# Patient Record
(demographics unavailable — no encounter records)

---

## 2024-11-07 NOTE — HISTORY OF PRESENT ILLNESS
[FreeTextEntry1] : PReP f/u [de-identified] : neck pain x 2+ years, preventing sleep. tried PT in past w/o help. chiro was helpful but then got too busy. willing to go back.  here for apretude inj and labs. seeing Dr Dumas, had anal test showing HPV but not sure about Pap or if done.  has been using DoxyPEP. right after sex with unknown partner. sometimes uses with primary partner too.

## 2024-11-07 NOTE — HISTORY OF PRESENT ILLNESS
[FreeTextEntry1] : PReP f/u [de-identified] : neck pain x 2+ years, preventing sleep. tried PT in past w/o help. chiro was helpful but then got too busy. willing to go back.  here for apretude inj and labs. seeing Dr Dumas, had anal test showing HPV but not sure about Pap or if done.  has been using DoxyPEP. right after sex with unknown partner. sometimes uses with primary partner too.

## 2025-02-18 NOTE — HEALTH RISK ASSESSMENT
[0] : 2) Feeling down, depressed, or hopeless: Not at all (0) [PHQ-2 Negative - No further assessment needed] : PHQ-2 Negative - No further assessment needed [Yes] : Yes [Monthly or less (1 pt)] : Monthly or less (1 point) [Monthly (2 pts)] : Monthly (2 points)

## 2025-02-21 NOTE — HISTORY OF PRESENT ILLNESS
[FreeTextEntry1] : pREP Follow up PHQ-2(0PT0 [de-identified] : 38 yo M here for PrEP (on Apretude) follow-up. Patient reports feeling generally well, he is glad to update that he recently traveled for a cruise in the [facility] and was able to tolerate both plane flights as well as the cruise without using Xanax for his phobia of flying/traveling. Patient has, however, still continued to struggle with anxiety: reports sensation of generalized anxiety about tasks he has to do at work as well as social anxiety. He feels best after exercising and when he is spending time with friends undergoing new experiences. He endorses marijuana usage in order to alleviate anxiety but complains of a decrease in energy as a result.   He has tried multiple medications with [facility] before and had SE of erectile dysfunction, so he stopped taking the medication. He is most interested in seeing a new therapist to get to the bottom of his anxiety.

## 2025-02-21 NOTE — HISTORY OF PRESENT ILLNESS
[FreeTextEntry1] : pREP Follow up PHQ-2(0PT0 [de-identified] : 36 yo M here for PrEP (on Apretude) follow-up. Patient reports feeling generally well, he is glad to update that he recently traveled for a cruise in the [facility] and was able to tolerate both plane flights as well as the cruise without using Xanax for his phobia of flying/traveling. Patient has, however, still continued to struggle with anxiety: reports sensation of generalized anxiety about tasks he has to do at work as well as social anxiety. He feels best after exercising and when he is spending time with friends undergoing new experiences. He endorses marijuana usage in order to alleviate anxiety but complains of a decrease in energy as a result.   He has tried multiple medications with [facility] before and had SE of erectile dysfunction, so he stopped taking the medication. He is most interested in seeing a new therapist to get to the bottom of his anxiety.

## 2025-02-21 NOTE — HISTORY OF PRESENT ILLNESS
[FreeTextEntry1] : pREP Follow up PHQ-2(0PT0 [de-identified] : 38 yo M here for PrEP (on Apretude) follow-up. Patient reports feeling generally well, he is glad to update that he recently traveled for a cruise in the [facility] and was able to tolerate both plane flights as well as the cruise without using Xanax for his phobia of flying/traveling. Patient has, however, still continued to struggle with anxiety: reports sensation of generalized anxiety about tasks he has to do at work as well as social anxiety. He feels best after exercising and when he is spending time with friends undergoing new experiences. He endorses marijuana usage in order to alleviate anxiety but complains of a decrease in energy as a result.   He has tried multiple medications with [facility] before and had SE of erectile dysfunction, so he stopped taking the medication. He is most interested in seeing a new therapist to get to the bottom of his anxiety.

## 2025-03-21 NOTE — HEALTH RISK ASSESSMENT
[0] : 2) Feeling down, depressed, or hopeless: Not at all (0) [PHQ-2 Negative - No further assessment needed] : PHQ-2 Negative - No further assessment needed [[id number]] : 0

## 2025-03-21 NOTE — HISTORY OF PRESENT ILLNESS
[FreeTextEntry1] : Pt is here for forms  PHQ-2(0PT) [de-identified] : every day for last month having focal anal pain, no d/c, no mucus. no blood. no F/C/NS. feeling fine otherwise. started within hours of having rec anal sex. no sex since. anxiety continues.  working with therapist now, three weeks in, going well. planning to take a mental health break beginning in mid April for 2-3 months. will need letter next visit

## 2025-04-15 NOTE — HISTORY OF PRESENT ILLNESS
[FreeTextEntry1] : PReP f/u [de-identified] : 38M MSM (on prep w apretude), anxiety disorder presents for f/u  Anal fissure significantly improved and anal pain completely resolved. Used topical ointment otc x 3 days that helped (phenyl+lidocaine)  No sexual partners since last visit  Anxiety is stable. Seeing therapist for past month and uses hydroxyzine prn when going to [place]  Wants to return in one month for FMLA paperwork given pt is leaving for 3 months for mental health break Feels well otherwise

## 2025-04-15 NOTE — REVIEW OF SYSTEMS
[Anxiety] : anxiety [Negative] : Gastrointestinal [Insomnia] : no insomnia [Depression] : no depression

## 2025-04-15 NOTE — HISTORY OF PRESENT ILLNESS
[FreeTextEntry1] : PReP f/u [de-identified] : 38M MSM (on prep w apretude), anxiety disorder presents for f/u  Anal fissure significantly improved and anal pain completely resolved. Used topical ointment otc x 3 days that helped (phenyl+lidocaine)  No sexual partners since last visit  Anxiety is stable. Seeing therapist for past month and uses hydroxyzine prn when going to [place]  Wants to return in one month for FMLA paperwork given pt is leaving for 3 months for mental health break Feels well otherwise

## 2025-05-20 NOTE — HEALTH RISK ASSESSMENT
[0] : 2) Feeling down, depressed, or hopeless: Not at all (0) [PHQ-2 Negative - No further assessment needed] : PHQ-2 Negative - No further assessment needed [[id number]] : 0

## 2025-05-20 NOTE — HEALTH RISK ASSESSMENT
[0] : 2) Feeling down, depressed, or hopeless: Not at all (0) [PHQ-2 Negative - No further assessment needed] : PHQ-2 Negative - No further assessment needed [[id number]] : 0

## 2025-05-20 NOTE — HISTORY OF PRESENT ILLNESS
[FreeTextEntry1] : FMLA Forms PHQ-2(0PT) [de-identified] : 38M Anxiety and on HIV PrEP with Apertude and doxy-PEP presents for anxiety symptoms with FMLA forms. He has been experiencing severe anxiety for years, with daily panic attacks Reports taking lexapro in the past, but discontinued d/t erectile dysfunction. Also has Xanax prescription for flying phobia but only uses when travelling. He currently has weekly psychotherapy. His symptoms are severe and affect both his home and work life. He reports being unable to perform key functions at his job as a  due to social anxiety, fear of public speaking and anxious thoughts keeping him from concentrating on tasks. He also finds that his anxiety leads him to binge-drink, reporting 10 drinks 3x per week at bars, primarily driven by social anxiety. He also smokes copious MJ when drunk and is interested in cutting back on alcohol and MJ. His current plan includes taking 3-month FMLA break from work and has already arranged this with them. here for paperwork primarily plus to discuss treatment plan

## 2025-05-20 NOTE — PHYSICAL EXAM
[No Acute Distress] : no acute distress [Well Nourished] : well nourished [Well Developed] : well developed [Well-Appearing] : well-appearing [Normal Sclera/Conjunctiva] : normal sclera/conjunctiva [PERRL] : pupils equal round and reactive to light [EOMI] : extraocular movements intact [Normal Outer Ear/Nose] : the outer ears and nose were normal in appearance [Normal Oropharynx] : the oropharynx was normal [No JVD] : no jugular venous distention [No Lymphadenopathy] : no lymphadenopathy [Supple] : supple [Thyroid Normal, No Nodules] : the thyroid was normal and there were no nodules present [No Accessory Muscle Use] : no accessory muscle use [Clear to Auscultation] : lungs were clear to auscultation bilaterally [Normal Rate] : normal rate  [Regular Rhythm] : with a regular rhythm [Normal S1, S2] : normal S1 and S2 [No Murmur] : no murmur heard [No Carotid Bruits] : no carotid bruits [No Abdominal Bruit] : a ~M bruit was not heard ~T in the abdomen [No Varicosities] : no varicosities [Pedal Pulses Present] : the pedal pulses are present [No Edema] : there was no peripheral edema [No Palpable Aorta] : no palpable aorta [No Extremity Clubbing/Cyanosis] : no extremity clubbing/cyanosis [Soft] : abdomen soft [Non Tender] : non-tender [Non-distended] : non-distended [No Masses] : no abdominal mass palpated [No HSM] : no HSM [Normal Bowel Sounds] : normal bowel sounds [Normal Posterior Cervical Nodes] : no posterior cervical lymphadenopathy [Normal Anterior Cervical Nodes] : no anterior cervical lymphadenopathy [No CVA Tenderness] : no CVA  tenderness [No Spinal Tenderness] : no spinal tenderness [No Joint Swelling] : no joint swelling [Grossly Normal Strength/Tone] : grossly normal strength/tone [No Rash] : no rash [Coordination Grossly Intact] : coordination grossly intact [No Focal Deficits] : no focal deficits [Normal Gait] : normal gait [Deep Tendon Reflexes (DTR)] : deep tendon reflexes were 2+ and symmetric [Normal Affect] : the affect was normal [Normal Insight/Judgement] : insight and judgment were intact [de-identified] : short coughing fit during examination

## 2025-05-20 NOTE — PHYSICAL EXAM
[No Acute Distress] : no acute distress [Well Nourished] : well nourished [Well Developed] : well developed [Well-Appearing] : well-appearing [Normal Sclera/Conjunctiva] : normal sclera/conjunctiva [PERRL] : pupils equal round and reactive to light [EOMI] : extraocular movements intact [Normal Outer Ear/Nose] : the outer ears and nose were normal in appearance [Normal Oropharynx] : the oropharynx was normal [No JVD] : no jugular venous distention [No Lymphadenopathy] : no lymphadenopathy [Supple] : supple [Thyroid Normal, No Nodules] : the thyroid was normal and there were no nodules present [No Accessory Muscle Use] : no accessory muscle use [Clear to Auscultation] : lungs were clear to auscultation bilaterally [Normal Rate] : normal rate  [Regular Rhythm] : with a regular rhythm [Normal S1, S2] : normal S1 and S2 [No Murmur] : no murmur heard [No Carotid Bruits] : no carotid bruits [No Abdominal Bruit] : a ~M bruit was not heard ~T in the abdomen [No Varicosities] : no varicosities [Pedal Pulses Present] : the pedal pulses are present [No Edema] : there was no peripheral edema [No Palpable Aorta] : no palpable aorta [No Extremity Clubbing/Cyanosis] : no extremity clubbing/cyanosis [Soft] : abdomen soft [Non Tender] : non-tender [Non-distended] : non-distended [No Masses] : no abdominal mass palpated [No HSM] : no HSM [Normal Bowel Sounds] : normal bowel sounds [Normal Posterior Cervical Nodes] : no posterior cervical lymphadenopathy [Normal Anterior Cervical Nodes] : no anterior cervical lymphadenopathy [No CVA Tenderness] : no CVA  tenderness [No Spinal Tenderness] : no spinal tenderness [No Joint Swelling] : no joint swelling [Grossly Normal Strength/Tone] : grossly normal strength/tone [No Rash] : no rash [Coordination Grossly Intact] : coordination grossly intact [No Focal Deficits] : no focal deficits [Normal Gait] : normal gait [Deep Tendon Reflexes (DTR)] : deep tendon reflexes were 2+ and symmetric [Normal Affect] : the affect was normal [Normal Insight/Judgement] : insight and judgment were intact [de-identified] : short coughing fit during examination

## 2025-05-20 NOTE — HISTORY OF PRESENT ILLNESS
[FreeTextEntry1] : FMLA Forms PHQ-2(0PT) [de-identified] : 38M Anxiety and on HIV PrEP with Apertude and doxy-PEP presents for anxiety symptoms with FMLA forms. He has been experiencing severe anxiety for years, with daily panic attacks Reports taking lexapro in the past, but discontinued d/t erectile dysfunction. Also has Xanax prescription for flying phobia but only uses when travelling. He currently has weekly psychotherapy. His symptoms are severe and affect both his home and work life. He reports being unable to perform key functions at his job as a  due to social anxiety, fear of public speaking and anxious thoughts keeping him from concentrating on tasks. He also finds that his anxiety leads him to binge-drink, reporting 10 drinks 3x per week at bars, primarily driven by social anxiety. He also smokes copious MJ when drunk and is interested in cutting back on alcohol and MJ. His current plan includes taking 3-month FMLA break from work and has already arranged this with them. here for paperwork primarily plus to discuss treatment plan

## 2025-06-13 NOTE — HISTORY OF PRESENT ILLNESS
[FreeTextEntry1] :  follow up Apretude [de-identified] : started only propranolol so far and it has worked great for acute anxiety situations like haircuts. now making it through very easily. decided not to start dulox or naltrexone yet d/t very busy this month and needs to be able to drink alcohol for jobs. mood has overall been better since taking a break from work. anxiety less. no change in amount of alcohol intake vs last time. planning to start duloxetine 6/30.  due for APR today as well

## 2025-06-13 NOTE — HISTORY OF PRESENT ILLNESS
[FreeTextEntry1] :  follow up Apretude [de-identified] : started only propranolol so far and it has worked great for acute anxiety situations like haircuts. now making it through very easily. decided not to start dulox or naltrexone yet d/t very busy this month and needs to be able to drink alcohol for jobs. mood has overall been better since taking a break from work. anxiety less. no change in amount of alcohol intake vs last time. planning to start duloxetine 6/30.  due for APR today as well

## 2025-07-24 NOTE — PHYSICAL EXAM
[No Acute Distress] : no acute distress [Well Nourished] : well nourished [Well Developed] : well developed [Well-Appearing] : well-appearing [Normal Sclera/Conjunctiva] : normal sclera/conjunctiva [Normal Outer Ear/Nose] : the outer ears and nose were normal in appearance [Supple] : supple [No Respiratory Distress] : no respiratory distress  [No Accessory Muscle Use] : no accessory muscle use [Normal Rate] : normal rate  [No Edema] : there was no peripheral edema [No Extremity Clubbing/Cyanosis] : no extremity clubbing/cyanosis [Soft] : abdomen soft [Non Tender] : non-tender [Non-distended] : non-distended [Speech Grossly Normal] : speech grossly normal [Memory Grossly Normal] : memory grossly normal [Normal Affect] : the affect was normal [Alert and Oriented x3] : oriented to person, place, and time [Normal Mood] : the mood was normal [Normal Insight/Judgement] : insight and judgment were intact [de-identified] : ~0.5cm non-ulcerating penile lesion present

## 2025-07-24 NOTE — PHYSICAL EXAM
[No Acute Distress] : no acute distress [Well Nourished] : well nourished [Well Developed] : well developed [Well-Appearing] : well-appearing [Normal Sclera/Conjunctiva] : normal sclera/conjunctiva [Normal Outer Ear/Nose] : the outer ears and nose were normal in appearance [Supple] : supple [No Respiratory Distress] : no respiratory distress  [No Accessory Muscle Use] : no accessory muscle use [Normal Rate] : normal rate  [No Edema] : there was no peripheral edema [No Extremity Clubbing/Cyanosis] : no extremity clubbing/cyanosis [Soft] : abdomen soft [Non Tender] : non-tender [Non-distended] : non-distended [Speech Grossly Normal] : speech grossly normal [Memory Grossly Normal] : memory grossly normal [Normal Affect] : the affect was normal [Alert and Oriented x3] : oriented to person, place, and time [Normal Mood] : the mood was normal [Normal Insight/Judgement] : insight and judgment were intact [de-identified] : ~0.5cm non-ulcerating penile lesion present

## 2025-07-24 NOTE — HEALTH RISK ASSESSMENT
[0] : 2) Feeling down, depressed, or hopeless: Not at all (0) [PHQ-2 Negative - No further assessment needed] : PHQ-2 Negative - No further assessment needed [[id number]] : 0

## 2025-07-24 NOTE — HISTORY OF PRESENT ILLNESS
[FreeTextEntry1] : Prep follow up PHQ-2(0pt) [de-identified] : Mr. Lieberman is a 39YO M with PMHx Anxiety and on HIV PrEP (with Apertude and doxy-PEP) who presents for follow-up regarding anxiety. Recently started on Duloxetine 30mg QD, which he is tolerating well (endorses some nausea and decreased libido which he is comfortable with, but no other sexual or other SEs.). He states his anxiety symptoms have decreased up to 50%, with less restlessness and feeling jittery with regards to needing to be out and about doing activities. He is no longer taking the Propranolol which does not have the same effect it did when he first started taking this. He recently took 20mg dose before an interview but states that it did not really help. Otherwise, he discontinued the Naltrexone 50mg QD for alcohol use because after taking it for 1 week, he noticed it made him excessively sedated/sleepy and unable to get out of bed. He notes he is not a "big drinker" and just needs to have something in his hand in social situations so has switched over to having non-alcoholic drinks.    Currently, he is also going to weekly therapy which has not been very helpful for him with current therapist since February. He is open to searching for other therapist options.  He plans to return to work in mid-August.   Otherwise, he notes that he has a lesion on the shaft of his penis (non-ulcerating) that has been present x1 week since last Monday. Prior to this, he had 1 sexual encounter 3 days before (the Saturday before, 7/12). His last sexual encounter prior to this was at the end of June. However, he used Doxy-PEP accordingly after the encounter. He notes that he was masturbating and feels the increased friction led to the lesion, which presented shortly after.   was only every a couple millimeters, never painful, never wet. about 50% improved already

## 2025-07-24 NOTE — HISTORY OF PRESENT ILLNESS
[FreeTextEntry1] : Prep follow up PHQ-2(0pt) [de-identified] : Mr. Lieberman is a 39YO M with PMHx Anxiety and on HIV PrEP (with Apertude and doxy-PEP) who presents for follow-up regarding anxiety. Recently started on Duloxetine 30mg QD, which he is tolerating well (endorses some nausea and decreased libido which he is comfortable with, but no other sexual or other SEs.). He states his anxiety symptoms have decreased up to 50%, with less restlessness and feeling jittery with regards to needing to be out and about doing activities. He is no longer taking the Propranolol which does not have the same effect it did when he first started taking this. He recently took 20mg dose before an interview but states that it did not really help. Otherwise, he discontinued the Naltrexone 50mg QD for alcohol use because after taking it for 1 week, he noticed it made him excessively sedated/sleepy and unable to get out of bed. He notes he is not a "big drinker" and just needs to have something in his hand in social situations so has switched over to having non-alcoholic drinks.    Currently, he is also going to weekly therapy which has not been very helpful for him with current therapist since February. He is open to searching for other therapist options.  He plans to return to work in mid-August.   Otherwise, he notes that he has a lesion on the shaft of his penis (non-ulcerating) that has been present x1 week since last Monday. Prior to this, he had 1 sexual encounter 3 days before (the Saturday before, 7/12). His last sexual encounter prior to this was at the end of June. However, he used Doxy-PEP accordingly after the encounter. He notes that he was masturbating and feels the increased friction led to the lesion, which presented shortly after.   was only every a couple millimeters, never painful, never wet. about 50% improved already

## 2025-07-24 NOTE — HEALTH RISK ASSESSMENT
[0] : 2) Feeling down, depressed, or hopeless: Not at all (0) [PHQ-2 Negative - No further assessment needed] : PHQ-2 Negative - No further assessment needed [[id number]] : 0